# Patient Record
Sex: MALE | Race: WHITE | Employment: FULL TIME | URBAN - METROPOLITAN AREA
[De-identification: names, ages, dates, MRNs, and addresses within clinical notes are randomized per-mention and may not be internally consistent; named-entity substitution may affect disease eponyms.]

---

## 2018-10-24 ENCOUNTER — OFFICE VISIT (OUTPATIENT)
Dept: FAMILY MEDICINE CLINIC | Facility: CLINIC | Age: 53
End: 2018-10-24
Payer: COMMERCIAL

## 2018-10-24 VITALS
HEART RATE: 82 BPM | SYSTOLIC BLOOD PRESSURE: 150 MMHG | BODY MASS INDEX: 31.5 KG/M2 | RESPIRATION RATE: 20 BRPM | HEIGHT: 70 IN | DIASTOLIC BLOOD PRESSURE: 92 MMHG | OXYGEN SATURATION: 98 % | TEMPERATURE: 99 F | WEIGHT: 220 LBS

## 2018-10-24 DIAGNOSIS — J06.9 VIRAL UPPER RESPIRATORY INFECTION: Primary | ICD-10-CM

## 2018-10-24 PROCEDURE — 99202 OFFICE O/P NEW SF 15 MIN: CPT | Performed by: NURSE PRACTITIONER

## 2018-10-24 RX ORDER — LOSARTAN POTASSIUM 100 MG/1
TABLET ORAL
COMMUNITY
Start: 2018-10-01

## 2018-10-24 RX ORDER — SIMVASTATIN 20 MG
TABLET ORAL
COMMUNITY
Start: 2018-10-01

## 2018-10-24 RX ORDER — FLUTICASONE PROPIONATE 50 MCG
2 SPRAY, SUSPENSION (ML) NASAL DAILY
Qty: 1 BOTTLE | Refills: 0 | Status: SHIPPED | OUTPATIENT
Start: 2018-10-24

## 2018-10-24 RX ORDER — LANSOPRAZOLE 30 MG/1
30 CAPSULE, DELAYED RELEASE ORAL
COMMUNITY

## 2018-10-24 NOTE — PROGRESS NOTES
CHIEF COMPLAINT:   Patient presents with:  Cold: s/s for 2 days,  no OTC meds taken  Nasal Congestion    HPI:   Jhonatan Burger is a 47year old male who presents for ill symptoms for 2 days.  Patient reports sore throat only at the beginning of sx's, clear c HEENT: atraumatic, normocephalic. conjunctiva clear. TM's gray, no bulging, no retraction, no fluid, bony landmarks intact. clear nasal discharge, nasal mucosa pink and mild inflamed. Oral mucosa pink, moist. Posterior pharynx is mild erythematous.  no exud You have a viral upper respiratory illness (URI), which is another term for the common cold. This illness is contagious during the first few days. It is spread through the air by coughing and sneezing.  It may also be spread by direct contact (touching the · Cough with lots of colored sputum (mucus)  · Severe headache; face, neck, or ear pain  · Difficulty swallowing due to throat pain  · Fever of 100.4°F (38°C) or higher, or as directed by your healthcare provider  Call 911  Call 911 if any of these occur:

## 2019-04-17 ENCOUNTER — HOSPITAL ENCOUNTER (OUTPATIENT)
Dept: GENERAL RADIOLOGY | Age: 54
Discharge: HOME | End: 2019-04-17
Attending: SPECIALIST
Payer: COMMERCIAL

## 2019-04-17 DIAGNOSIS — M20.21 ACQUIRED HALLUX RIGIDUS OF RIGHT FOOT: ICD-10-CM

## 2019-04-17 DIAGNOSIS — M79.671 RIGHT FOOT PAIN: ICD-10-CM

## 2019-04-17 PROCEDURE — 73630 X-RAY EXAM OF FOOT: CPT | Performed by: SPECIALIST

## 2025-03-26 ENCOUNTER — ORDER TRANSCRIPTION (OUTPATIENT)
Dept: ADMINISTRATIVE | Facility: HOSPITAL | Age: 60
End: 2025-03-26

## 2025-03-26 DIAGNOSIS — Z13.6 SCREENING FOR CARDIOVASCULAR CONDITION: Primary | ICD-10-CM
